# Patient Record
Sex: MALE | Race: WHITE | NOT HISPANIC OR LATINO | Employment: FULL TIME | URBAN - METROPOLITAN AREA
[De-identification: names, ages, dates, MRNs, and addresses within clinical notes are randomized per-mention and may not be internally consistent; named-entity substitution may affect disease eponyms.]

---

## 2017-08-06 ENCOUNTER — OCCUPATIONAL MEDICINE (OUTPATIENT)
Dept: URGENT CARE | Facility: CLINIC | Age: 22
End: 2017-08-06
Payer: COMMERCIAL

## 2017-08-06 VITALS
RESPIRATION RATE: 14 BRPM | HEIGHT: 63 IN | TEMPERATURE: 98.5 F | HEART RATE: 72 BPM | BODY MASS INDEX: 20.02 KG/M2 | OXYGEN SATURATION: 97 % | DIASTOLIC BLOOD PRESSURE: 60 MMHG | WEIGHT: 113 LBS | SYSTOLIC BLOOD PRESSURE: 102 MMHG

## 2017-08-06 DIAGNOSIS — L03.011 CELLULITIS OF FINGER OF RIGHT HAND: ICD-10-CM

## 2017-08-06 DIAGNOSIS — Z02.1 PRE-EMPLOYMENT DRUG SCREENING: ICD-10-CM

## 2017-08-06 LAB
AMP AMPHETAMINE: NORMAL
BREATH ALCOHOL COMMENT: NORMAL
COC COCAINE: NORMAL
INT CON NEG: NORMAL
INT CON POS: NORMAL
MET METHAMPHETAMINES: NORMAL
OPI OPIATES: NORMAL
PCP PHENCYCLIDINE: NORMAL
POC BREATHALIZER: 0 PERCENT (ref 0–0.01)
POC DRUG COMMENT 753798-OCCUPATIONAL HEALTH: NEGATIVE
THC: NORMAL

## 2017-08-06 PROCEDURE — 80305 DRUG TEST PRSMV DIR OPT OBS: CPT | Performed by: NURSE PRACTITIONER

## 2017-08-06 PROCEDURE — 82075 ASSAY OF BREATH ETHANOL: CPT | Performed by: NURSE PRACTITIONER

## 2017-08-06 PROCEDURE — 99203 OFFICE O/P NEW LOW 30 MIN: CPT | Mod: 29 | Performed by: NURSE PRACTITIONER

## 2017-08-06 RX ORDER — SULFAMETHOXAZOLE AND TRIMETHOPRIM 800; 160 MG/1; MG/1
1 TABLET ORAL 2 TIMES DAILY
Qty: 14 TAB | Refills: 0 | Status: SHIPPED | OUTPATIENT
Start: 2017-08-06 | End: 2017-08-11 | Stop reason: SDUPTHER

## 2017-08-06 RX ORDER — CEPHALEXIN 500 MG/1
500 CAPSULE ORAL 3 TIMES DAILY
Qty: 30 CAP | Refills: 0 | Status: CANCELLED | OUTPATIENT
Start: 2017-08-06 | End: 2017-08-16

## 2017-08-06 ASSESSMENT — ENCOUNTER SYMPTOMS
DIZZINESS: 0
FEVER: 0
TINGLING: 0
NAUSEA: 0
CHILLS: 0
SENSORY CHANGE: 0
BRUISES/BLEEDS EASILY: 0

## 2017-08-06 ASSESSMENT — LIFESTYLE VARIABLES: SUBSTANCE_ABUSE: 0

## 2017-08-06 NOTE — PROGRESS NOTES
"Subjective:      Hebert Sawyer is a 21 y.o. male who presents with Finger Injury    Chief Complaint   Patient presents with   • Finger Injury     NEW WC DOI 08/2/17 (R) ring finger       Denies past medical, surgical or family history that is significant to today's problem.   RX or OTC medications reviewed with patient today.   No Known Allergies         HPI DOI is uncertain to patient. He does not recall an injury but thinks that he may have had a small splinter in his finger from a wooden mop. Yesterday he noticed a small blister on his right 3rd finger and then he used a needle to 'pop' the blister. Blood and pus came out. Today there is increased redness of his entire finger. No further drainage but now it is very painful and he cannot bend his finger. Pain is severe. Treatment tried; popping it with a \"needle', OTC neosporin ointment. Tdap < 10 years per pt verbal report. Does not wear ring on his finger.     Review of Systems   Constitutional: Negative for fever and chills.   Gastrointestinal: Negative for nausea.   Musculoskeletal: Negative for joint pain.   Neurological: Negative for dizziness, tingling and sensory change.   Endo/Heme/Allergies: Does not bruise/bleed easily.   Psychiatric/Behavioral: Negative for substance abuse.          Objective:     /60 mmHg  Pulse 72  Temp(Src) 36.9 °C (98.5 °F)  Resp 14  Ht 1.6 m (5' 3\")  Wt 51.256 kg (113 lb)  BMI 20.02 kg/m2  SpO2 97%     Physical Exam   Constitutional: He is oriented to person, place, and time. He appears well-developed and well-nourished.   Cardiovascular: Normal rate.    Pulmonary/Chest: Effort normal.   Neurological: He is alert and oriented to person, place, and time.   Skin: There is erythema.   Psychiatric: He has a normal mood and affect. His behavior is normal. Thought content normal.   Nursing note and vitals reviewed.      Right 3rd finger with a  3 mm superficial ulcerated area on palmar side of  Proximal phalange " joint , surrounded by erythema and EMILY starting to extending into palm of hand and circumrential finger. No drainage.Tender with movement. No joint tenderness or crepitus with palpation. No streaks.  Limited ROM to right hand secondary to pain and swelling.        Assessment/Plan:     1. Cellulitis of finger of right hand    - sulfamethoxazole-trimethoprim (BACTRIM DS) 800-160 MG tablet; Take 1 Tab by mouth 2 times a day for 7 days.  Dispense: 14 Tab; Refill: 0      Epsom water soaks BID   Educated in proper administration of medication(s) ordered today including safety, possible SE, risks, benefits, rationale and alternatives to therapy.   See [place] D39 and C4

## 2017-08-06 NOTE — Clinical Note
"   [facility] [place] [facility]   [address] [facility] [address] [place] [address]  Phone: [phone] - Fax: [phone]        [facility] Progress Report and Disability Certification  Date of Service: 8/6/2017   No Show:  No  Date / Time of Next Visit: 8/9/2017@10:10 AM   Claim Information   Patient Name: Hebert Sawyer  Claim Number:     Employer:   [employer] Date of Injury: 8/2/2017     Insurer / TPA: Dwayne Hudson  ID / SSN:     Occupation: [employer]  Diagnosis: The encounter diagnosis was Cellulitis of finger of right hand.    Medical Information   Related to Industrial Injury?      Subjective Complaints:  DOI is uncertain to patient. He does not recall an injury but thinks that he may have had a small splinter in his finger from a wooden mop. Yesterday he noticed a small blister on his right 3rd finger and then he used a needle to 'pop' the blister. Blood and pus came out. Today there is increased redness of his entire finger. No further drainage but now it is very painful and he cannot bend his finger. Pain is severe. Treatment tried; popping it with a \"needle', OTC neosporin ointment. Tdap < 10 years per pt verbal report.    Objective Findings: Right 3rd finger with a  3 mm superficial ulcerated area on palmar side of  Proximal phalange joint , surrounded by erythema and EMILY starting to extending into palm of hand and circumrential finger. No drainage.Tender with movement. No joint tenderness or crepitus with palpation. No streaks.  Limited ROM to right hand secondary to pain and swelling.    Pre-Existing Condition(s): denies   Assessment:   Initial Visit    Status: Additional Care Required  Permanent Disability:     Plan: Medication    Diagnostics:      Comments:       Disability Information   Status: Released to Restricted Duty    From:  8/6/2017  Through: 8/9/2017 Restrictions are: Temporary   Physical Restrictions   Sitting:    Standing:    Stooping:    Bending:      "   Squatting:    Walking:    Climbing:    Pushing:      Pulling:    Other:    Reaching Above Shoulder (L):   Reaching Above Shoulder (R):       Reaching Below Shoulder (L):    Reaching Below Shoulder (R):      Not to exceed Weight Limits   Carrying(hrs):   Weight Limit(lb):   Lifting(hrs):   Weight  Limit(lb):     Comments: Keep right hand clean and dry. Dressing on right 3rd finger.     Repetitive Actions   Hands: i.e. Fine Manipulations from Grasping:     Feet: i.e. Operating Foot Controls:     Driving / Operate Machinery:     Physician Name: Sharee Coe AFranciscoPOLMAN Physician Signature:   SHAREE COE e-Signature: Dr. Monster Mckeon, Medical Director   Clinic Name / Location: [address] [address] Clinic Phone Number: Dept: [phone]   Appointment Time: 10:30 Am Visit Start Time: 10:47 AM   Check-In Time:  10:38 Am Visit Discharge Time:  11:32 AM   Original-Treating Physician or Chiropractor    Page 2-Insurer/TPA    Page 3-Employer    Page 4-Employee

## 2017-08-06 NOTE — MR AVS SNAPSHOT
"Hebert Tomasz Digna   2017 10:30 AM   Occupational Medicine   MRN: [id number]    Department:  [facility]   Dept Phone:  [phone]    Description:  Male : 1995   Provider:  JAMAL Rondon           Reason for Visit     Finger Injury NEW WC DOI 17 (R) ring finger      Allergies as of 2017     No Known Allergies      You were diagnosed with     Cellulitis of finger of right hand   [[id number]]   right 3rd finger      Vital Signs     Blood Pressure Pulse Temperature Respirations Height Weight    102/60 mmHg 72 36.9 °C (98.5 °F) 14 1.6 m (5' 3\") 51.256 kg (113 lb)    Body Mass Index Oxygen Saturation                20.02 kg/m2 97%          Basic Information     Date Of Birth Sex Race Ethnicity Preferred Language    1995 Male White Non- English      Your appointments     Aug 09, 2017 10:10 AM   Workers Compensation (Long) with Jose Bain D.O.   [address] [address]   [phone]              Health Maintenance     Patient has no pending health maintenance at this time      Current Immunizations     No immunizations on file.      Below and/or attached are the medications your provider expects you to take. Review all of your home medications and newly ordered medications with your provider and/or pharmacist. Follow medication instructions as directed by your provider and/or pharmacist. Please keep your medication list with you and share with your provider. Update the information when medications are discontinued, doses are changed, or new medications (including over-the-counter products) are added; and carry medication information at all times in the event of emergency situations     Allergies:  No Known Allergies          Medications  Valid as of: 2017 - 11:35 AM    Generic Name Brand Name Tablet Size Instructions for use    Sulfamethoxazole-Trimethoprim (Tab) BACTRIM DS " 800-160 MG Take 1 Tab by mouth 2 times a day for 7 days.        .                 Medicines prescribed today were sent to:     [facility] [address] [place] [address], [place] [address] [facility] [address]    [address] [facility] [address]    Phone: [phone] Fax: [phone]    Open 24 Hours?: No      Medication refill instructions:       If your prescription bottle indicates you have medication refills left, it is not necessary to call your provider’s office. Please contact your pharmacy and they will refill your medication.    If your prescription bottle indicates you do not have any refills left, you may request refills at any time through one of the following ways: The online Omada system (except Urgent Care), by calling your provider’s office, or by asking your pharmacy to contact your provider’s office with a refill request. Medication refills are processed only during regular business hours and may not be available until the next business day. Your provider may request additional information or to have a follow-up visit with you prior to refilling your medication.   *Please Note: Medication refills are assigned a new Rx number when refilled electronically. Your pharmacy may indicate that no refills were authorized even though a new prescription for the same medication is available at the pharmacy. Please request the medicine by name with the pharmacy before contacting your provider for a refill.           Omada Access Code: [email or web address]  Expires: 9/5/2017 11:35 AM    Your email address is not on file at [facility].  Email Addresses are required for you to sign up for Omada, please contact [phone] to verify your personal information and to provide your email address prior to attempting to register for Omada.    Hebert Sawyer  [address] [place] [address]    Omada  A secure, online tool to manage your health information     [facility]’s Omada® is a  secure, online tool that connects you to your personalized health information from the privacy of your home -- day or night - making it very easy for you to manage your healthcare. Once the activation process is completed, you can even access your medical information using the [employer] hailey, which is available for free in the Apple Hailey store or Google Play store.     To learn more about [employer], visit [email or web address]    There are two levels of access available (as shown below):   My Chart Features  [facility]  Urgent  Care Non-[facility] Primary Care Doctor   Email your healthcare team securely and privately 24/7 X X X    Manage appointments: schedule your next appointment; view details of past/upcoming appointments X      Request prescription refills. X      View recent personal medical records, including lab and immunizations X X X X   View health record, including health history, allergies, medications X X X X   Read reports about your outpatient visits, procedures, consult and ER notes X X X X   See your discharge summary, which is a recap of your hospital and/or ER visit that includes your diagnosis, lab results, and care plan X X  X     How to register for [employer]:  Once your e-mail address has been verified, follow the following steps to sign up for [employer].     1. Go to  [email or web address]  2. Click on the Sign Up Now box, which takes you to the New Member Sign Up page. You will need to provide the following information:  a. Enter your [employer] Access Code exactly as it appears at the top of this page. (You will not need to use this code after you’ve completed the sign-up process. If you do not sign up before the expiration date, you must request a new code.)   b. Enter your date of birth.   c. Enter your home email address.   d. Click Submit, and follow the next screen’s instructions.  3. Create a [employer] ID. This will be your [employer] login ID and cannot be  changed, so think of one that is secure and easy to remember.  4. Create a [facility] password. You can change your password at any time.  5. Enter your Password Reset Question and Answer. This can be used at a later time if you forget your password.   6. Enter your e-mail address. This allows you to receive e-mail notifications when new information is available in St[facility].  7. Click Sign Up. You can now view your health information.    For assistance activating your St[employer] account, call [phone]

## 2017-08-06 NOTE — Clinical Note
"EMPLOYEE’S CLAIM FOR COMPENSATION/ REPORT OF INITIAL TREATMENT  FORM C-4    EMPLOYEE’S CLAIM - PROVIDE ALL INFORMATION REQUESTED   First Name  Hebert Last Name  Digna Birthdate                    1995                Sex  male Claim Number   Home Address  [address] Age  21 y.o. Height  1.6 m (5' 3\") Weight  51.256 kg (113 lb) [facility]     [facility] Zip  [address] Telephone  [phone] (home)    Mailing Address  [address] [facility] Zip  [address] Primary Language Spoken  English    Insurer  Unknown Third Party   Dwayne Hudson Employee's Occupation (Job Title) When Injury or Occupational Disease Occurred      Employer's Name    [employer]      Employer Address   [address]   [address]   Date of Injury  8/2/2017               Hour of Injury  6:00 PM Date Employer Notified  8/6/2017 Last Day of Work after Injury or Occupational Disease  8/3/2017 Supervisor to Whom Injury Reported  Aurelio   Address or Location of Accident (if applicable)  [[address] [facility] [address]]   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 (Be specific an answer in detail. Use additional sheet if necessary)  While sweeping I recieved a splinter, Later my finger began to swll and became infected.   If you believe that you have an occupational disease, when did you first have knowledge of the disability and it relationship to your employment?  na Witnesses to the Accident  na      Nature of Injury or Occupational Disease  Defer  Part(s) of Body Injured or Affected  Hand (R), Finger (R),     I certify that the above is true and correct to the best of my knowledge and that I have provided this information in order to obtain the benefits of [place] Insurance and " [place] or [place]).  I hereby authorize any physician, chiropractor, surgeon, practitioner, or other person, any hospital, including [facility], any medical service organization, any insurance company, or other institution or organization to release to each other, any medical or other information, including benefits paid or payable, pertinent to this injury or disease, except information relative to diagnosis, treatment and/or counseling for AIDS, psychological conditions, alcohol or controlled substances, for which I must give specific authorization.  A Photostat of this authorization shall be as valid as the original.     Date   Place   Employee’s Signature   THIS REPORT MUST BE COMPLETED AND MAILED WITHIN 3 WORKING DAYS OF TREATMENT   Place  [facility]  Name of Facility  [facility]   Date  8/6/2017 Diagnosis  (L03.011) Cellulitis of finger of right hand Is there evidence the injured employee was under the influence of alcohol and/or another controlled substance at the time of accident?   Hour  10:47 AM Description of Injury or Disease  The encounter diagnosis was Cellulitis of finger of right hand.   Comments:unable to determine. Denies injury. Denies removal of a splinter or even remembering that a splinter got into finger.    Treatment  Wound care.   Have you advised the patient to remain off work five days or more? No   X-Ray Findings      If Yes   From Date  To Date      From information given by the employee, together with medical evidence, can you directly connect this injury or occupational disease as job incurred?    Comments:unable to determine - see previous comments.  If No Full Duty  No Modified Duty  Yes   Is additional medical care by a physician indicated?  Yes If Modified Duty, Specify any Limitations / Restrictions  See [place]D39    Do you know of any previous injury or disease contributing to  "this condition or occupational disease?                            No   Date  8/6/2017 Print Doctor’s Name JAMAL Rondon I certify the employer’s copy of  this form was mailed on:   Address  [address] Insurer’s Use Only     [place] Zip  [address]    Provider’s Tax ID Number  [id number]  Telephone  Dept: [phone]          SHAREE MAZARIEGOS   e-Signature: Dr. Monster Mckeon, Medical Director Degree  APN        ORIGINAL-TREATING PHYSICIAN OR CHIROPRACTOR    PAGE 2-INSURER/TPA    PAGE 3-EMPLOYER    PAGE 4-EMPLOYEE             Form C-4 (rev10/07)              BRIEF DESCRIPTION OF RIGHTS AND BENEFITS  (Pursuant to [place]RS 616C.050)    Notice of Injury or Occupational Disease (Incident Report Form C-1): If an injury or occupational disease (OD) arises out of and in the  course of employment, you must provide written notice to your employer as soon as practicable, but no later than 7 days after the accident or  OD. Your employer shall maintain a sufficient supply of the required forms.    Claim for Compensation (Form C-4): If medical treatment is sought, the form C-4 is available at the place of initial treatment. A completed  \"Claim for Compensation\" (Form C-4) must be filed within 90 days after an accident or OD. The treating physician or chiropractor must,  within 3 working days after treatment, complete and mail to the employer, the employer's insurer and third-party , the Claim for  Compensation.    Medical Treatment: If you require medical treatment for your on-the-job injury or OD, you may be required to select a physician or  chiropractor from a list provided by your workers’ compensation insurer, if it has contracted with an Organization for Managed Care (MCO) or  Preferred Provider Organization (PPO) or providers of health care. If your employer has not entered into a contract with an MCO or PPO, you  may select a physician or " chiropractor from the Panel of Physicians and Chiropractors. Any medical costs related to your industrial injury or  OD will be paid by your insurer.    Temporary Total Disability (TTD): If your doctor has certified that you are unable to work for a period of at least 5 consecutive days, or 5  cumulative days in a 20-day period, or places restrictions on you that your employer does not accommodate, you may be entitled to TTD  compensation.    Temporary Partial Disability (TPD): If the wage you receive upon reemployment is less than the compensation for TTD to which you are  entitled, the insurer may be required to pay you TPD compensation to make up the difference. TPD can only be paid for a maximum of 24  months.    Permanent Partial Disability (PPD): When your medical condition is stable and there is an indication of a PPD as a result of your injury or  OD, within 30 days, your insurer must arrange for an evaluation by a rating physician or chiropractor to determine the degree of your PPD. The  amount of your PPD award depends on the date of injury, the results of the PPD evaluation and your age and wage.    Permanent Total Disability (PTD): If you are medically certified by a treating physician or chiropractor as permanently and totally disabled  and have been granted a PTD status by your insurer, you are entitled to receive monthly benefits not to exceed 66 2/3% of your average  monthly wage. The amount of your PTD payments is subject to reduction if you previously received a PPD award.    Vocational Rehabilitation Services: You may be eligible for vocational rehabilitation services if you are unable to return to the job due to a  permanent physical impairment or permanent restrictions as a result of your injury or occupational disease.    Transportation and Per Darlin Reimbursement: You may be eligible for travel expenses and per darlin associated with medical treatment.    Reopening: You may be able to reopen your  claim if your condition worsens after claim closure.    Appeal Process: If you disagree with a written determination issued by the insurer or the insurer does not respond to your request, you may  appeal to the Department of Administration, , by following the instructions contained in your determination letter. You must  appeal the determination within 70 days from the date of the determination letter at [address], [address], [place], [place]  [address], or [address], [place], [place] [address]. If you disagree with the  decision, you may appeal to the  Department of Administration, . You must file your appeal within 30 days from the date of the  decision  letter at [address], [address], [place], [place] [address], or [address], [facility] [address], [place], [place] [address]. If you  disagree with a decision of an , you may file a petition for judicial review with the District Court. You must do so within 30  days of the Appeal Officer’s decision. You may be represented by an  at your own expense or you may contact the [facility] for possible  representation.    [place]  for Injured Workers (NAIW): If you disagree with a  decision, you may request that NAIW represent you  without charge at an  Hearing. For information regarding denial of benefits, you may contact the [facility] at: [address], [place] [address], [phone], or [address], [place] [address], [phone]    To File a Complaint with the Division: If you wish to file a complaint with the  of the Division of Industrial Relations (DIR),  please contact the Workers’ Compensation Section, [address], [address], [place], [place] [address], telephone [phone], or  [address], [place]  [address], telephone ([phone]) [phone].    For assistance with Workers’ Compensation Issues: you may contact the Office of the Governor Consumer Health Assistance, [address] [place] [address], [facility] [address], [place] [address], Toll Free 1-108.673.4909, Web site: http://[employer].[facility].nv., E-mail  [email or web address].[email or web address].                                                                                                                                                                                                                                   __________________________________________________________________                                                                   _________________                Employee Name / Signature                                                                                                                                                       Date                                                                                                                                                                                                     D-2 (rev. 10/07)

## 2017-08-09 ENCOUNTER — OCCUPATIONAL MEDICINE (OUTPATIENT)
Dept: OCCUPATIONAL MEDICINE | Facility: CLINIC | Age: 22
End: 2017-08-09
Payer: COMMERCIAL

## 2017-08-09 VITALS
OXYGEN SATURATION: 95 % | DIASTOLIC BLOOD PRESSURE: 62 MMHG | SYSTOLIC BLOOD PRESSURE: 112 MMHG | HEART RATE: 90 BPM | HEIGHT: 63 IN | BODY MASS INDEX: 20.02 KG/M2 | WEIGHT: 113 LBS | TEMPERATURE: 99.3 F

## 2017-08-09 DIAGNOSIS — L03.011 CELLULITIS OF FINGER OF RIGHT HAND: ICD-10-CM

## 2017-08-09 PROCEDURE — 99204 OFFICE O/P NEW MOD 45 MIN: CPT | Performed by: PREVENTIVE MEDICINE

## 2017-08-09 RX ORDER — CEFTRIAXONE 1 G/1
1 INJECTION, POWDER, FOR SOLUTION INTRAMUSCULAR; INTRAVENOUS ONCE
Status: COMPLETED | OUTPATIENT
Start: 2017-08-09 | End: 2017-08-09

## 2017-08-09 RX ADMIN — CEFTRIAXONE 1 G: 1 INJECTION, POWDER, FOR SOLUTION INTRAMUSCULAR; INTRAVENOUS at 10:40

## 2017-08-09 ASSESSMENT — PAIN SCALES - GENERAL: PAINLEVEL: 2=MINIMAL-SLIGHT

## 2017-08-09 NOTE — Clinical Note
[address],   [address] [place] [address]  Phone: [phone] - Fax: [phone]        Occupational Health [facility] Progress Report and Disability Certification  Date of Service: 8/9/2017   No Show:  No  Date / Time of Next Visit: 8/11/2017, okay to double book at 8am   Claim Information   Patient Name: Hebert Sawyer  Claim Number:     Employer:   [employer]  Date of Injury: 8/2/2017     Insurer / TPA: Dwayne Hudson  ID / SSN:     Occupation: [employer]  Diagnosis: The encounter diagnosis was Cellulitis of finger of right hand.    Medical Information   Related to Industrial Injury?   Comments:Indeterminate    Subjective Complaints:  DOI 8/2/2017: He does not recall a specific injury but thinks that he may have had a small splinter in his finger from a wooden mop. He developed a blister on his right middle finger, which he popped and drained pus and blood. The finger became increasingly swollen and red and so presented to urgent care on 8/6/17. He was given Bactrim. Patient notes possible mild improvement in erythema and swelling. He states the wound has been draining pus. He's been doing Epsom salt soaks twice daily. He's been taking the Bactrim as prescribed. Denies any fevers or chills. Continues her pain in the third digit in hand, contents of restricted range of motion with finger.   Objective Findings: Right Hand: Ruptured bullae and third digit near PIP joint. Small open abscess in area of ruptured bullae. Area with significant surrounding erythema and swelling extending just past the MCP and almost to the DIP joints. Open abscess draining pus. Abscess was expressed manually until no drainage was forthcoming.. Limited range of motion of the third digit due to pain and swelling.   Pre-Existing Condition(s):     Assessment:   Condition Same    Status: Additional Care Required  Permanent Disability:No    Plan:      Diagnostics:      Comments:  Given  minimal improvement gave Rocephin 1g IM once.  Continue Bactrim  Change dressing over abscess at least twice daily, keep are clean and dry  Continue Epsom salt soaks twice daily, dry  thoroughly afterwards.  Restricted duty  Follow-up Friday    Disability Information   Status: Released to Restricted Duty    From:  8/9/2017  Through: 8/11/2017 Restrictions are: Temporary   Physical Restrictions   Sitting:    Standing:    Stooping:    Bending:      Squatting:    Walking:    Climbing:    Pushing:      Pulling:    Other:    Reaching Above Shoulder (L):   Reaching Above Shoulder (R):       Reaching Below Shoulder (L):    Reaching Below Shoulder (R):      Not to exceed Weight Limits   Carrying(hrs):   Weight Limit(lb):   Lifting(hrs):   Weight  Limit(lb):     Comments: Limited use of right hand. No lifting more than 5 lbs. Minimize forceful gripping, pinching or grasping.    Repetitive Actions   Hands: i.e. Fine Manipulations from Grasping:     Feet: i.e. Operating Foot Controls:     Driving / Operate Machinery:     Physician Name: Jose Bain D.O. Physician Signature: everettSignTAYLJOSE QUINTANILLA D.O. e-Signature: Dr. Monster Mckeon, Medical Director   Clinic Name / Location: [address],   [address]  [place] [address] Clinic Phone Number: Dept: [phone]   Appointment Time: 10:10 Am Visit Start Time: 9:57 AM   Check-In Time:  9:55 Am Visit Discharge Time: 11:04 AM   Original-Treating Physician or Chiropractor    Page 2-Insurer/TPA    Page 3-Employer    Page 4-Employee

## 2017-08-09 NOTE — PROGRESS NOTES
"Subjective:      Hebert Sawyer is a 21 y.o. male who presents with Follow-Up      DOI 8/2/2017: He does not recall a specific injury but thinks that he may have had a small splinter in his finger from a wooden mop. He developed a blister on his right middle finger, which he popped and drained pus and blood. The finger became increasingly swollen and red and so presented to urgent care on 8/6/17. He was given Bactrim. Patient notes possible mild improvement in erythema and swelling. He states the wound has been draining pus. He's been doing Epsom salt soaks twice daily. He's been taking the Bactrim as prescribed. Denies any fevers or chills. Continues her pain in the third digit in hand, contents of restricted range of motion with finger.     HPI    ROS  ROS: All systems were reviewed on intake form, form was reviewed and signed. See scanned documents in media. Pertinent positives and negatives included in HPI.    PMH: No pertinent past medical history to this problem  MEDS: Medications were reviewed in Epic  ALLERGIES: No Known Allergies  SOCHX: Works as a   FH: No pertinent family history to this problem      Objective:     /62 mmHg  Pulse 90  Temp(Src) 37.4 °C (99.3 °F)  Ht 1.6 m (5' 3\")  Wt 51.256 kg (113 lb)  BMI 20.02 kg/m2  SpO2 95%     Physical Exam   Constitutional: He is oriented to person, place, and time. He appears well-developed and well-nourished.   HENT:   Right Ear: External ear normal.   Left Ear: External ear normal.   Eyes: Conjunctivae and EOM are normal.   Cardiovascular: Normal rate.    Pulmonary/Chest: Effort normal. No respiratory distress.   Neurological: He is alert and oriented to person, place, and time.   Skin: Skin is warm and dry.   Psychiatric: He has a normal mood and affect. Judgment normal.       Right Hand: Ruptured bullae and third digit near PIP joint. Small open abscess in area of ruptured bullae. Area with significant surrounding erythema and " swelling extending just past the MCP and almost to the DIP joints. Open abscess draining pus. Abscess was expressed manually until no drainage was forthcoming.. Limited range of motion of the third digit due to pain and swelling.       Assessment/Plan:     1. Cellulitis of finger of right hand  - cefTRIAXone (ROCEPHIN) injection 1 g; 1,000 mg by Intramuscular route Once.    Given minimal improvement gave Rocephin 1g IM once.  Continue Bactrim  Change dressing over abscess at least twice daily, keep are clean and dry  Continue Epsom salt soaks twice daily, dry  thoroughly afterwards.  Restricted duty  Follow-up Friday

## 2017-08-09 NOTE — MR AVS SNAPSHOT
"        Hebert Sawyer   2017 10:10 AM   Occupational Medicine   MRN: [id number]    Department:  [facility]   Dept Phone:  [phone]    Description:  Male : 1995   Provider:  Jose Bain D.O.           Reason for Visit     Follow-Up WC DOI 2017 - R Finger -       Allergies as of 2017     No Known Allergies      You were diagnosed with     Cellulitis of finger of right hand   [[id number]]         Vital Signs     Blood Pressure Pulse Temperature Height Weight Body Mass Index    112/62 mmHg 90 37.4 °C (99.3 °F) 1.6 m (5' 3\") 51.256 kg (113 lb) 20.02 kg/m2    Oxygen Saturation                   95%           Basic Information     Date Of Birth Sex Race Ethnicity Preferred Language    1995 Male White Non- English      Health Maintenance        Date Due Completion Dates    IMM HEP B VACCINE (1 of 3 - Primary Series) 1995 ---    IMM HEP A VACCINE (1 of 2 - Standard Series) 10/2/1998 ---    IMM HPV VACCINE (1 of 3 - Male 3 Dose Series) 10/2/2008 ---    IMM VARICELLA (CHICKENPOX) VACCINE (1 of 2 - 2 Dose Adolescent Series) 10/2/2010 ---    IMM MENINGOCOCCAL VACCINE (MCV4) (1 of 1) 10/2/2013 ---    IMM DTaP/Tdap/Td Vaccine (1 - Tdap) 10/2/2016 ---    IMM INFLUENZA (1) 2017 ---            Current Immunizations     No immunizations on file.      Below and/or attached are the medications your provider expects you to take. Review all of your home medications and newly ordered medications with your provider and/or pharmacist. Follow medication instructions as directed by your provider and/or pharmacist. Please keep your medication list with you and share with your provider. Update the information when medications are discontinued, doses are changed, or new medications (including over-the-counter products) are added; and carry medication information at all times in the event of emergency situations     Allergies:  No Known Allergies          Medications  Valid as of: August " 09, 2017 - 10:57 AM    Generic Name Brand Name Tablet Size Instructions for use    Sulfamethoxazole-Trimethoprim (Tab) BACTRIM -160 MG Take 1 Tab by mouth 2 times a day for 7 days.        .                 Medicines prescribed today were sent to:     [facility], [place] - [address] AT [facility] [address]    [address] [facility] [place] [address]    Phone: [phone] Fax: [phone]    Open 24 Hours?: No      Medication refill instructions:       If your prescription bottle indicates you have medication refills left, it is not necessary to call your provider’s office. Please contact your pharmacy and they will refill your medication.    If your prescription bottle indicates you do not have any refills left, you may request refills at any time through one of the following ways: The online Zoop system (except Urgent Care), by calling your provider’s office, or by asking your pharmacy to contact your provider’s office with a refill request. Medication refills are processed only during regular business hours and may not be available until the next business day. Your provider may request additional information or to have a follow-up visit with you prior to refilling your medication.   *Please Note: Medication refills are assigned a new Rx number when refilled electronically. Your pharmacy may indicate that no refills were authorized even though a new prescription for the same medication is available at the pharmacy. Please request the medicine by name with the pharmacy before contacting your provider for a refill.           Zoop Access Code: [email or web address]  Expires: 9/5/2017 11:35 AM    Your email address is not on file at [facility].  Email Addresses are required for you to sign up for Zoop, please contact [phone] to verify your personal information and to provide your email address prior to attempting to register for Zoop.    Hebert Sawyer  [address]  [place] [address]    Yoyot  A secure, online tool to manage your health information     [employer]’s Yoyot® is a secure, online tool that connects you to your personalized health information from the privacy of your home -- day or night - making it very easy for you to manage your healthcare. Once the activation process is completed, you can even access your medical information using the [employer] hailey, which is available for free in the Apple Hailey store or Google Play store.     To learn more about [employer], visit www.Schedule C [email or web address]/Yoyot    There are two levels of access available (as shown below):   My Chart Features  [facility] Primary Care Doctor [facility]  Specialists [facility]  Urgent  Care Non-[facility] Primary Care Doctor   Email your healthcare team securely and privately 24/7 X X X    Manage appointments: schedule your next appointment; view details of past/upcoming appointments X      Request prescription refills. X      View recent personal medical records, including lab and immunizations X X X X   View health record, including health history, allergies, medications X X X X   Read reports about your outpatient visits, procedures, consult and ER notes X X X X   See your discharge summary, which is a recap of your hospital and/or ER visit that includes your diagnosis, lab results, and care plan X X  X     How to register for [employer]:  Once your e-mail address has been verified, follow the following steps to sign up for [employer].     1. Go to  [email or web address]  2. Click on the Sign Up Now box, which takes you to the New Member Sign Up page. You will need to provide the following information:  a. Enter your [employer] Access Code exactly as it appears at the top of this page. (You will not need to use this code after you’ve completed the sign-up process. If you do not sign up before the expiration date, you must request a new code.)   b. Enter your date of birth.   c. Enter your home email address.    d. Click Submit, and follow the next screen’s instructions.  3. Create a Home Online Income Systemst ID. This will be your Home Online Income Systemst login ID and cannot be changed, so think of one that is secure and easy to remember.  4. Create a Home Online Income Systemst password. You can change your password at any time.  5. Enter your Password Reset Question and Answer. This can be used at a later time if you forget your password.   6. Enter your e-mail address. This allows you to receive e-mail notifications when new information is available in 12Society.  7. Click Sign Up. You can now view your health information.    For assistance activating your 12Society account, call [phone]

## 2017-08-11 ENCOUNTER — OCCUPATIONAL MEDICINE (OUTPATIENT)
Dept: OCCUPATIONAL MEDICINE | Facility: CLINIC | Age: 22
End: 2017-08-11
Payer: COMMERCIAL

## 2017-08-11 VITALS
HEART RATE: 77 BPM | SYSTOLIC BLOOD PRESSURE: 98 MMHG | DIASTOLIC BLOOD PRESSURE: 70 MMHG | BODY MASS INDEX: 19.49 KG/M2 | TEMPERATURE: 98.7 F | WEIGHT: 110 LBS | OXYGEN SATURATION: 97 % | RESPIRATION RATE: 12 BRPM | HEIGHT: 63 IN

## 2017-08-11 DIAGNOSIS — L03.011 CELLULITIS OF FINGER OF RIGHT HAND: ICD-10-CM

## 2017-08-11 PROCEDURE — 99213 OFFICE O/P EST LOW 20 MIN: CPT | Performed by: PREVENTIVE MEDICINE

## 2017-08-11 RX ORDER — AMOXICILLIN AND CLAVULANATE POTASSIUM 875; 125 MG/1; MG/1
1 TABLET, FILM COATED ORAL 2 TIMES DAILY
Qty: 20 TAB | Refills: 0 | Status: SHIPPED | OUTPATIENT
Start: 2017-08-11 | End: 2017-08-21

## 2017-08-11 RX ORDER — SULFAMETHOXAZOLE AND TRIMETHOPRIM 800; 160 MG/1; MG/1
1 TABLET ORAL 2 TIMES DAILY
Qty: 6 TAB | Refills: 0 | Status: SHIPPED | OUTPATIENT
Start: 2017-08-11 | End: 2017-08-14

## 2017-08-11 ASSESSMENT — PAIN SCALES - GENERAL: PAINLEVEL: 1=MINIMAL PAIN

## 2017-08-11 NOTE — MR AVS SNAPSHOT
"        Hebert Sawyer   2017 8:00 AM   Occupational Medicine   MRN: [id number]    Department:  [facility]   Dept Phone:  [phone]    Description:  Male : 1995   Provider:  Jose Bain D.O.           Reason for Visit     Follow-Up WC DOI 2017 - R Finger - Better - ROOM 2      Allergies as of 2017     No Known Allergies      You were diagnosed with     Cellulitis of finger of right hand   [[id number]]         Vital Signs     Blood Pressure Pulse Temperature Respirations Height Weight    98/70 mmHg 77 37.1 °C (98.7 °F) 12 1.6 m (5' 3\") 49.896 kg (110 lb)    Body Mass Index Oxygen Saturation                19.49 kg/m2 97%          Basic Information     Date Of Birth Sex Race Ethnicity Preferred Language    1995 Male White Non- English      Your appointments     Aug 15, 2017  8:20 AM   Workers Compensation with Jose Bain D.O.   [address] [id number] [phone]              Health Maintenance        Date Due Completion Dates    IMM HEP B VACCINE (1 of 3 - Primary Series) 1995 ---    IMM HEP A VACCINE (1 of 2 - Standard Series) 10/2/1998 ---    IMM HPV VACCINE (1 of 3 - Male 3 Dose Series) 10/2/2008 ---    IMM VARICELLA (CHICKENPOX) VACCINE (1 of 2 - 2 Dose Adolescent Series) 10/2/2010 ---    IMM MENINGOCOCCAL VACCINE (MCV4) (1 of 1) 10/2/2013 ---    IMM DTaP/Tdap/Td Vaccine (1 - Tdap) 10/2/2016 ---    IMM INFLUENZA (1) 2017 ---            Current Immunizations     No immunizations on file.      Below and/or attached are the medications your provider expects you to take. Review all of your home medications and newly ordered medications with your provider and/or pharmacist. Follow medication instructions as directed by your provider and/or pharmacist. Please keep your medication list with you and share with your provider. Update the information when medications are discontinued, doses " are changed, or new medications (including over-the-counter products) are added; and carry medication information at all times in the event of emergency situations     Allergies:  No Known Allergies          Medications  Valid as of: August 11, 2017 -  8:36 AM    Generic Name Brand Name Tablet Size Instructions for use    Amoxicillin-Pot Clavulanate (Tab) AUGMENTIN 875-125 MG Take 1 Tab by mouth 2 times a day for 10 days.        Sulfamethoxazole-Trimethoprim (Tab) BACTRIM -160 MG Take 1 Tab by mouth 2 times a day for 3 days.        .                 Medicines prescribed today were sent to:     [facility] [address] [place] [address] [place], [place] - [address] AT [facility] & [facility]    [address] [facility] [place] [address]    Phone: [phone] Fax: [phone]    Open 24 Hours?: No      Medication refill instructions:       If your prescription bottle indicates you have medication refills left, it is not necessary to call your provider’s office. Please contact your pharmacy and they will refill your medication.    If your prescription bottle indicates you do not have any refills left, you may request refills at any time through one of the following ways: The online BeachMint system (except Urgent Care), by calling your provider’s office, or by asking your pharmacy to contact your provider’s office with a refill request. Medication refills are processed only during regular business hours and may not be available until the next business day. Your provider may request additional information or to have a follow-up visit with you prior to refilling your medication.   *Please Note: Medication refills are assigned a new Rx number when refilled electronically. Your pharmacy may indicate that no refills were authorized even though a new prescription for the same medication is available at the pharmacy. Please request the medicine by name with the pharmacy before contacting your provider for a refill.           BeachMint  Access Code: [email or web address]  Expires: 9/5/2017 11:35 AM    Your email address is not on file at [facility].  Email Addresses are required for you to sign up for Flipora, please contact [phone] to verify your personal information and to provide your email address prior to attempting to register for Flipora.    Hebert Tomasz Digna  [address] [facility] [place] [address]    Adspert | Bidmanagement GmbHt  A secure, online tool to manage your health information     [employer]’s Flipora® is a secure, online tool that connects you to your personalized health information from the privacy of your home -- day or night - making it very easy for you to manage your healthcare. Once the activation process is completed, you can even access your medical information using the Flipora chris, which is available for free in the Apple Chris store or Google Play store.     To learn more about Flipora, visit [email or web address]/Adspert | Bidmanagement GmbHt    There are two levels of access available (as shown below):   My Chart Features  [facility] Primary Care Doctor [facility]  Specialists [facility]  Urgent  Care Non-[facility] Primary Care Doctor   Email your healthcare team securely and privately 24/7 X X X    Manage appointments: schedule your next appointment; view details of past/upcoming appointments X      Request prescription refills. X      View recent personal medical records, including lab and immunizations X X X X   View health record, including health history, allergies, medications X X X X   Read reports about your outpatient visits, procedures, consult and ER notes X X X X   See your discharge summary, which is a recap of your hospital and/or ER visit that includes your diagnosis, lab results, and care plan X X  X     How to register for Adspert | Bidmanagement [employer]t:  Once your e-mail address has been verified, follow the following steps to sign up for Adspert | Bid[employer]t.     1. Go to  [email or web address]  2. Click on the Sign Up Now box, which takes you to the New Member Sign Up page. You will need  to provide the following information:  a. Enter your [employer] Access Code exactly as it appears at the top of this page. (You will not need to use this code after you’ve completed the sign-up process. If you do not sign up before the expiration date, you must request a new code.)   b. Enter your date of birth.   c. Enter your home email address.   d. Click Submit, and follow the next screen’s instructions.  3. Create a [employer] ID. This will be your [employer] login ID and cannot be changed, so think of one that is secure and easy to remember.  4. Create a [employer] password. You can change your password at any time.  5. Enter your Password Reset Question and Answer. This can be used at a later time if you forget your password.   6. Enter your e-mail address. This allows you to receive e-mail notifications when new information is available in [employer].  7. Click Sign Up. You can now view your health information.    For assistance activating your [employer] account, call [phone]

## 2017-08-11 NOTE — Clinical Note
[address],   [address] [place] [address]  Phone: [phone] - Fax: [phone]        [facility] Progress Report and Disability Certification  Date of Service: 8/11/2017   No Show:  No  Date / Time of Next Visit: 8/15/2017 @ 8:20am   Claim Information   Patient Name: Hebert Sawyer  Claim Number:     Employer:   [employer] Date of Injury: 8/2/2017     Insurer / TPA: Dwayne Hudson  ID / SSN:     Occupation: [employer]  Diagnosis: The encounter diagnosis was Cellulitis of finger of right hand.    Medical Information   Related to Industrial Injury? No    Subjective Complaints:  DOI 8/2/2017: He does not recall a specific injury but thinks that he may have had a small splinter in his finger from a wooden mop. He developed a blister on his right middle finger, which he popped and drained pus and blood. The finger became increasingly swollen and red and so presented to urgent care on 8/6/17. He was given Bactrim on 8/6. Then given IM Rocephin on 8/9. Patient notes some improvement with erythema and swelling, less drainage.   Objective Findings: Right Hand: Ruptured bullae and third digit near PIP joint. Small open abscess in area of ruptured bullae, no drainage . Area with significant surrounding erythema and swelling extending just past the MCP and almost to the DIP joints, somewhat lessened compared to last visit. Limited range of motion of the third digit due to pain and swelling, but slightly improved.   Pre-Existing Condition(s):     Assessment:   Condition Same    Status: Additional Care Required  Permanent Disability:No    Plan:      Diagnostics:      Comments:  Extended Bactrim from 7 days to 10 day supply  Prescribed Augmentin twice daily for 10 days  Continue wound care  Restricted duty   Follow up Tuesday    Disability Information   Status: Released to Full Duty    From:  8/11/2017  Through: 8/15/2017 Restrictions are:  Temporary   Physical Restrictions   Sitting:    Standing:    Stooping:    Bending:      Squatting:    Walking:    Climbing:    Pushing:      Pulling:    Other:    Reaching Above Shoulder (L):   Reaching Above Shoulder (R):       Reaching Below Shoulder (L):    Reaching Below Shoulder (R):      Not to exceed Weight Limits   Carrying(hrs):   Weight Limit(lb):   Lifting(hrs):   Weight  Limit(lb):     Comments: Limited use of right hand. No lifting more than 5 lbs. Minimize forceful gripping, pinching or grasping.      Repetitive Actions   Hands: i.e. Fine Manipulations from Grasping:     Feet: i.e. Operating Foot Controls:     Driving / Operate Machinery:     Physician Name: Jose Bain D.O. Physician Signature: everettSignTAJOSE BLAKELY D.O. e-Signature: Dr. Monster Mckeon, Medical Director   Clinic Name / Location: [address],   [address] [place] [address] Clinic Phone Number: Dept: [phone]   Appointment Time: 8:00 Am Visit Start Time: 8:16 AM   Check-In Time:  8:05 Am Visit Discharge Time:  8:33am   Original-Treating Physician or Chiropractor    Page 2-Insurer/TPA    Page 3-Employer    Page 4-Employee

## 2017-08-11 NOTE — PROGRESS NOTES
"Subjective:      Hebert Sawyer is a 21 y.o. male who presents with Follow-Up      DOI 8/2/2017: He does not recall a specific injury but thinks that he may have had a small splinter in his finger from a wooden mop. He developed a blister on his right middle finger, which he popped and drained pus and blood. The finger became increasingly swollen and red and so presented to urgent care on 8/6/17. He was given Bactrim on 8/6. Then given IM Rocephin on 8/9. Patient notes some improvement with erythema and swelling, less drainage.     HPI    ROS  SOCHX: Works as a    FH: No pertinent family history to this problem.         Objective:     BP 98/70 mmHg  Pulse 77  Temp(Src) 37.1 °C (98.7 °F)  Resp 12  Ht 1.6 m (5' 3\")  Wt 49.896 kg (110 lb)  BMI 19.49 kg/m2  SpO2 97%     Physical Exam   Constitutional: He is oriented to person, place, and time. He appears well-developed and well-nourished.   Cardiovascular: Normal rate.    Pulmonary/Chest: Effort normal.   Neurological: He is alert and oriented to person, place, and time.   Skin: Skin is warm and dry.   Psychiatric: He has a normal mood and affect. Judgment normal.       Right Hand: Ruptured bullae and third digit near PIP joint. Small open abscess in area of ruptured bullae, no drainage . Area with significant surrounding erythema and swelling extending just past the MCP and almost to the DIP joints, somewhat lessened compared to last visit. Limited range of motion of the third digit due to pain and swelling, but slightly improved.       Assessment/Plan:     1. Cellulitis of finger of right hand  - sulfamethoxazole-trimethoprim (BACTRIM DS) 800-160 MG tablet; Take 1 Tab by mouth 2 times a day for 3 days.  Dispense: 6 Tab; Refill: 0  - amoxicillin-clavulanate (AUGMENTIN) 875-125 MG Tab; Take 1 Tab by mouth 2 times a day for 10 days.  Dispense: 20 Tab; Refill: 0    Extended Bactrim from 7 days to 10 day supply   Prescribed Augmentin twice daily for 10 " days   Continue wound care   Restricted duty   Follow up Tuesday

## 2017-08-15 ENCOUNTER — OCCUPATIONAL MEDICINE (OUTPATIENT)
Dept: OCCUPATIONAL MEDICINE | Facility: CLINIC | Age: 22
End: 2017-08-15
Payer: COMMERCIAL

## 2017-08-15 VITALS
SYSTOLIC BLOOD PRESSURE: 110 MMHG | WEIGHT: 110 LBS | DIASTOLIC BLOOD PRESSURE: 64 MMHG | OXYGEN SATURATION: 100 % | HEIGHT: 63 IN | TEMPERATURE: 98.4 F | RESPIRATION RATE: 12 BRPM | HEART RATE: 67 BPM | BODY MASS INDEX: 19.49 KG/M2

## 2017-08-15 DIAGNOSIS — L03.011 CELLULITIS OF FINGER OF RIGHT HAND: ICD-10-CM

## 2017-08-15 PROCEDURE — 99213 OFFICE O/P EST LOW 20 MIN: CPT | Performed by: PREVENTIVE MEDICINE

## 2017-08-15 ASSESSMENT — PAIN SCALES - GENERAL: PAINLEVEL: NO PAIN

## 2017-08-15 NOTE — PROGRESS NOTES
"Subjective:      Hebert Sawyer is a 21 y.o. male who presents with Follow-Up      DOI 8/2/2017: Does not recall a specific injury but thinks that he may have had a small splinter in his right middle finger from a wooden mop. He developed a blister, which he popped and drained pus and blood. The finger became increasingly swollen and red and so presented to urgent care on 8/6/17. He was given Bactrim on 8/6. Then given IM Rocephin on 8/9 and started on Augmentin. Patient notes significant improvement in the swelling and pain at the finger. Denies any drainage. He states she's been taking antibiotic as prescribed.           HPI    ROS  SOCHX: Works as a    FH: No pertinent family history to this problem.         Objective:     /64 mmHg  Pulse 67  Temp(Src) 36.9 °C (98.4 °F)  Resp 12  Ht 1.6 m (5' 2.99\")  Wt 49.896 kg (110 lb)  BMI 19.49 kg/m2  SpO2 100%     Physical Exam   Constitutional: He is oriented to person, place, and time. He appears well-developed and well-nourished.   Cardiovascular: Normal rate.    Pulmonary/Chest: Effort normal.   Neurological: He is alert and oriented to person, place, and time.   Psychiatric: He has a normal mood and affect. Judgment normal.       Right Hand: Ruptured bullae and third digit near PIP joint. Well-healing abscess, no drainage . Erythema in the area of the ruptured bullae, but surrounding tissues are all normal. Full range of motion of digit. No tenderness to palpation.       Assessment/Plan:     1. Cellulitis of finger of right hand  Continue antibiotics as prescribed, day 8/10 Bactrim, day 4/10 Augmentin  Okay for full duty, keep wound covered at work  Follow-up one week        "

## 2017-08-15 NOTE — MR AVS SNAPSHOT
"        Hebert Sawyer   8/15/2017 8:20 AM   Occupational Medicine   MRN: [id number]    Department:  [facility]   Dept Phone:  [phone]    Description:  Male : 1995   Provider:  Jose Bain D.O.           Reason for Visit     Follow-Up WC DOI 2017 - R Finger - Better - ROOM 3      Allergies as of 8/15/2017     No Known Allergies      You were diagnosed with     Cellulitis of finger of right hand   [[id number]]         Vital Signs     Blood Pressure Pulse Temperature Respirations Height Weight    110/64 mmHg 67 36.9 °C (98.4 °F) 12 1.6 m (5' 2.99\") 49.896 kg (110 lb)    Body Mass Index Oxygen Saturation                19.49 kg/m2 100%          Basic Information     Date Of Birth Sex Race Ethnicity Preferred Language    1995 Male White Non- English      Your appointments     Aug 21, 2017  8:20 AM   Workers Compensation with Jose Bain D.O.   [address] [id number] [phone]              Health Maintenance        Date Due Completion Dates    IMM HEP B VACCINE (1 of 3 - Primary Series) 1995 ---    IMM HEP A VACCINE (1 of 2 - Standard Series) 10/2/1998 ---    IMM HPV VACCINE (1 of 3 - Male 3 Dose Series) 10/2/2008 ---    IMM VARICELLA (CHICKENPOX) VACCINE (1 of 2 - 2 Dose Adolescent Series) 10/2/2010 ---    IMM MENINGOCOCCAL VACCINE (MCV4) (1 of 1) 10/2/2013 ---    IMM DTaP/Tdap/Td Vaccine (1 - Tdap) 10/2/2016 ---    IMM INFLUENZA (1) 2017 ---            Current Immunizations     No immunizations on file.      Below and/or attached are the medications your provider expects you to take. Review all of your home medications and newly ordered medications with your provider and/or pharmacist. Follow medication instructions as directed by your provider and/or pharmacist. Please keep your medication list with you and share with your provider. Update the information when medications are discontinued, " doses are changed, or new medications (including over-the-counter products) are added; and carry medication information at all times in the event of emergency situations     Allergies:  No Known Allergies          Medications  Valid as of: August 15, 2017 -  8:39 AM    Generic Name Brand Name Tablet Size Instructions for use    Amoxicillin-Pot Clavulanate (Tab) AUGMENTIN 875-125 MG Take 1 Tab by mouth 2 times a day for 10 days.        .                 Medicines prescribed today were sent to:     [facility] [address] [place] [address] & [address] [address]    Phone: [phone] Fax: [phone]    Open 24 Hours?: No      Medication refill instructions:       If your prescription bottle indicates you have medication refills left, it is not necessary to call your provider’s office. Please contact your pharmacy and they will refill your medication.    If your prescription bottle indicates you do not have any refills left, you may request refills at any time through one of the following ways: The online CornerBlue system (except Urgent Care), by calling your provider’s office, or by asking your pharmacy to contact your provider’s office with a refill request. Medication refills are processed only during regular business hours and may not be available until the next business day. Your provider may request additional information or to have a follow-up visit with you prior to refilling your medication.   *Please Note: Medication refills are assigned a new Rx number when refilled electronically. Your pharmacy may indicate that no refills were authorized even though a new prescription for the same medication is available at the pharmacy. Please request the medicine by name with the pharmacy before contacting your provider for a refill.           CornerBlue Access Code: [email or web address]  Expires: 9/5/2017 11:35 AM    Your email address is not on file at [email or web address].  Email Addresses are required for you to sign up for [employer], please contact [phone] to verify your personal information and to provide your email address prior to attempting to register for BioMotiv.    Hebert Sawyer  [address] [place] [address]    Musik[employer]  A secure, online tool to manage your health information     [employer]’s BioMotiv® is a secure, online tool that connects you to your personalized health information from the privacy of your home -- day or night - making it very easy for you to manage your healthcare. Once the activation process is completed, you can even access your medical information using the BioMotiv hailey, which is available for free in the Apple Hailey store or Google Play store.     To learn more about BioMotiv, visit [email or web address]    There are two levels of access available (as shown below):   My Chart Features  [facility] Primary Care Doctor [facility]  Specialists [facility]  Urgent  Care Non-[facility] Primary Care Doctor   Email your healthcare team securely and privately 24/7 X X X    Manage appointments: schedule your next appointment; view details of past/upcoming appointments X      Request prescription refills. X      View recent personal medical records, including lab and immunizations X X X X   View health record, including health history, allergies, medications X X X X   Read reports about your outpatient visits, procedures, consult and ER notes X X X X   See your discharge summary, which is a recap of your hospital and/or ER visit that includes your diagnosis, lab results, and care plan X X  X     How to register for BioMotiv:  Once your e-mail address has been verified, follow the following steps to sign up for BioMotiv.     1. Go to  [email or web address]  2. Click on the Sign Up Now box, which takes you to the New Member Sign Up page. You will need to provide the following information:  a. Enter your BioMotiv Access Code exactly as it appears at the top  of this page. (You will not need to use this code after you’ve completed the sign-up process. If you do not sign up before the expiration date, you must request a new code.)   b. Enter your date of birth.   c. Enter your home email address.   d. Click Submit, and follow the next screen’s instructions.  3. Create a Jotky ID. This will be your Jotky login ID and cannot be changed, so think of one that is secure and easy to remember.  4. Create a Jotky password. You can change your password at any time.  5. Enter your Password Reset Question and Answer. This can be used at a later time if you forget your password.   6. Enter your e-mail address. This allows you to receive e-mail notifications when new information is available in Jotky.  7. Click Sign Up. You can now view your health information.    For assistance activating your Jotky account, call [phone]

## 2017-08-15 NOTE — Clinical Note
[address],   [address] [place] [address]  Phone: [phone] - Fax: [phone]        [facility] Progress Report and Disability Certification  Date of Service: 8/15/2017   No Show:  No  Date / Time of Next Visit: 8/21/2017 @ 8:20am   Claim Information   Patient Name: Hebert Sawyer  Claim Number:     Employer:   [employer] Date of Injury: 8/2/2017     Insurer / TPA: Dwayne Hudson  ID / SSN:     Occupation: [employer]  Diagnosis: The encounter diagnosis was Cellulitis of finger of right hand.    Medical Information   Related to Industrial Injury?   Comments:indeterminate    Subjective Complaints:  DOI 8/2/2017: Does not recall a specific injury but thinks that he may have had a small splinter in his right middle finger from a wooden mop. He developed a blister, which he popped and drained pus and blood. The finger became increasingly swollen and red and so presented to urgent care on 8/6/17. He was given Bactrim on 8/6. Then given IM Rocephin on 8/9 and started on Augmentin. Patient notes significant improvement in the swelling and pain at the finger. Denies any drainage. He states she's been taking antibiotic as prescribed.         Objective Findings: Right Hand: Ruptured bullae and third digit near PIP joint. Well-healing abscess, no drainage . Erythema in the area of the ruptured bullae, but surrounding tissues are all normal. Full range of motion of digit. No tenderness to palpation.   Pre-Existing Condition(s):     Assessment:   Condition Improved    Status: Additional Care Required  Permanent Disability:No    Plan:      Diagnostics:      Comments:  Continue antibiotics as prescribed, day 8/10 Bactrim, day 4/10 Augmentin  Okay for full duty, keep wound covered at work  Follow-up one week    Disability Information   Status: Released to Full Duty    From:  8/15/2017  Through: 8/21/2017 Restrictions are:     Physical  Restrictions   Sitting:    Standing:    Stooping:    Bending:      Squatting:    Walking:    Climbing:    Pushing:      Pulling:    Other:    Reaching Above Shoulder (L):   Reaching Above Shoulder (R):       Reaching Below Shoulder (L):    Reaching Below Shoulder (R):      Not to exceed Weight Limits   Carrying(hrs):   Weight Limit(lb):   Lifting(hrs):   Weight  Limit(lb):     Comments:      Repetitive Actions   Hands: i.e. Fine Manipulations from Grasping:     Feet: i.e. Operating Foot Controls:     Driving / Operate Machinery:     Physician Name: Jose Bain D.O. Physician Signature: JOSE Willoughby D.O. e-Signature: Dr. Monster Mckeon, Medical Director   Clinic Name / Location: [address],   [address] [place] [address] Clinic Phone Number: Dept: [phone]   Appointment Time: 8:20 Am Visit Start Time: 8:10 AM   Check-In Time:  8:00 Am Visit Discharge Time:  8:37am   Original-Treating Physician or Chiropractor    Page 2-Insurer/TPA    Page 3-Employer    Page 4-Employee

## 2017-08-21 ENCOUNTER — OCCUPATIONAL MEDICINE (OUTPATIENT)
Dept: OCCUPATIONAL MEDICINE | Facility: CLINIC | Age: 22
End: 2017-08-21
Payer: COMMERCIAL

## 2017-08-21 VITALS
TEMPERATURE: 98.1 F | RESPIRATION RATE: 14 BRPM | WEIGHT: 110 LBS | BODY MASS INDEX: 19.49 KG/M2 | OXYGEN SATURATION: 96 % | HEART RATE: 76 BPM | SYSTOLIC BLOOD PRESSURE: 120 MMHG | DIASTOLIC BLOOD PRESSURE: 64 MMHG | HEIGHT: 63 IN

## 2017-08-21 DIAGNOSIS — L03.011 CELLULITIS OF FINGER OF RIGHT HAND: ICD-10-CM

## 2017-08-21 PROCEDURE — 99212 OFFICE O/P EST SF 10 MIN: CPT | Performed by: PREVENTIVE MEDICINE

## 2017-08-21 ASSESSMENT — PAIN SCALES - GENERAL: PAINLEVEL: NO PAIN

## 2017-08-21 NOTE — PROGRESS NOTES
"Subjective:      Hebert Sawyer is a 21 y.o. male who presents with Follow-Up      DOI 8/2/2017: Does not recall a specific injury but thinks that he may have had a small splinter in his right middle finger from a wooden mop. He developed a blister, which he popped and drained pus and blood. The finger became increasingly swollen and red and so presented to urgent care on 8/6/17. He was given Bactrim on 8/6. Then given IM Rocephin on 8/9 and started on Augmentin.     HPI    ROS       Objective:     /64 mmHg  Pulse 76  Temp(Src) 36.7 °C (98.1 °F)  Resp 14  Ht 1.6 m (5' 2.99\")  Wt 49.896 kg (110 lb)  BMI 19.49 kg/m2  SpO2 96%     Physical Exam    Right Hand: Well-healed ruptured bullae. No abscess. Slight erythema palmar fourth digit, no warmth. Full range of motion of digit. No tenderness to palpation.       Assessment/Plan:     1. Cellulitis of finger of right hand  Finish remaining antibiotics. No special care required at this point  Full Duty  Release from care  Follow up as needed          "

## 2017-08-21 NOTE — Clinical Note
[address],   [address] [place] [address]  Phone: [phone] - Fax: [phone]        [facility] Progress Report and Disability Certification  Date of Service: 8/21/2017   No Show:  No  Date / Time of Next Visit:  Release from care   Claim Information   Patient Name: Hebert Sawyer  Claim Number:     Employer:   [employer]   Date of Injury: 8/2/2017     Insurer / TPA: Dwayne Hudson  ID / SSN:     Occupation: [employer]  Diagnosis: The encounter diagnosis was Cellulitis of finger of right hand.    Medical Information   Related to Industrial Injury?   Comments:indeterminate    Subjective Complaints:  DOI 8/2/2017: Does not recall a specific injury but thinks that he may have had a small splinter in his right middle finger from a wooden mop. He developed a blister, which he popped and drained pus and blood. The finger became increasingly swollen and red and so presented to urgent care on 8/6/17. He was given Bactrim on 8/6. Then given IM Rocephin on 8/9 and started on Augmentin.   Objective Findings: Right Hand: Well-healed ruptured bullae. No abscess. Slight erythema palmar fourth digit, no warmth. Full range of motion of digit. No tenderness to palpation.   Pre-Existing Condition(s):     Assessment:   Condition Improved    Status: Discharged /  MMI  Permanent Disability:No    Plan:      Diagnostics:      Comments:  Finish remaining antibiotics. No special care required at this point  Full Duty  Release from care  Follow up as needed    Disability Information   Status: Released to Full Duty    From:  8/21/2017  Through:   Restrictions are:     Physical Restrictions   Sitting:    Standing:    Stooping:    Bending:      Squatting:    Walking:    Climbing:    Pushing:      Pulling:    Other:    Reaching Above Shoulder (L):   Reaching Above Shoulder (R):       Reaching Below Shoulder (L):    Reaching Below Shoulder (R):      Not to  exceed Weight Limits   Carrying(hrs):   Weight Limit(lb):   Lifting(hrs):   Weight  Limit(lb):     Comments:      Repetitive Actions   Hands: i.e. Fine Manipulations from Grasping:     Feet: i.e. Operating Foot Controls:     Driving / Operate Machinery:     Physician Name: Jose Bain D.O. Physician Signature: [email or web address] BLAKELY D.O. e-Signature: Dr. Monster Mckeon, Medical Director   Clinic Name / Location: [address],   [address] [place] [address] Clinic Phone Number: Dept: [phone]   Appointment Time: 8:20 Am Visit Start Time: 8:13 AM   Check-In Time:  8:09 Am Visit Discharge Time: @8:26AM    Original-Treating Physician or Chiropractor    Page 2-Insurer/TPA    Page 3-Employer    Page 4-Employee

## 2017-08-21 NOTE — MR AVS SNAPSHOT
"        Hebert Sawyer   2017 8:20 AM   Occupational Medicine   MRN: [id number]    Department:  [facility]   Dept Phone:  [phone]    Description:  Male : 1995   Provider:  Jose Bain D.O.           Reason for Visit     Follow-Up WC DOI 2017 - Finger - Better - ROOM 2      Allergies as of 2017     No Known Allergies      You were diagnosed with     Cellulitis of finger of right hand   [432431]         Vital Signs     Blood Pressure Pulse Temperature Respirations Height Weight    120/64 mmHg 76 36.7 °C (98.1 °F) 14 1.6 m (5' 2.99\") 49.896 kg (110 lb)    Body Mass Index Oxygen Saturation                19.49 kg/m2 96%          Basic Information     Date Of Birth Sex Race Ethnicity Preferred Language    1995 Male White Non- English      Health Maintenance        Date Due Completion Dates    IMM HEP B VACCINE (1 of 3 - Primary Series) 1995 ---    IMM HEP A VACCINE (1 of 2 - Standard Series) 10/2/1998 ---    IMM HPV VACCINE (1 of 3 - Male 3 Dose Series) 10/2/2008 ---    IMM VARICELLA (CHICKENPOX) VACCINE (1 of 2 - 2 Dose Adolescent Series) 10/2/2010 ---    IMM MENINGOCOCCAL VACCINE (MCV4) (1 of 1) 10/2/2013 ---    IMM DTaP/Tdap/Td Vaccine (1 - Tdap) 10/2/2016 ---    IMM INFLUENZA (1) 2017 ---            Current Immunizations     No immunizations on file.      Below and/or attached are the medications your provider expects you to take. Review all of your home medications and newly ordered medications with your provider and/or pharmacist. Follow medication instructions as directed by your provider and/or pharmacist. Please keep your medication list with you and share with your provider. Update the information when medications are discontinued, doses are changed, or new medications (including over-the-counter products) are added; and carry medication information at all times in the event of emergency situations     Allergies:  No Known Allergies          "   Medications  Valid as of: August 21, 2017 - 11:06 AM    Generic Name Brand Name Tablet Size Instructions for use    Amoxicillin-Pot Clavulanate (Tab) AUGMENTIN 875-125 MG Take 1 Tab by mouth 2 times a day for 10 days.        .                 Medicines prescribed today were sent to:     [facility], [place] - [address]    [address] [place] [address]    Phone: [phone] Fax: [phone]    Open 24 Hours?: No      Medication refill instructions:       If your prescription bottle indicates you have medication refills left, it is not necessary to call your provider’s office. Please contact your pharmacy and they will refill your medication.    If your prescription bottle indicates you do not have any refills left, you may request refills at any time through one of the following ways: The online Moh[employer] system (except Urgent Care), by calling your provider’s office, or by asking your pharmacy to contact your provider’s office with a refill request. Medication refills are processed only during regular business hours and may not be available until the next business day. Your provider may request additional information or to have a follow-up visit with you prior to refilling your medication.   *Please Note: Medication refills are assigned a new Rx number when refilled electronically. Your pharmacy may indicate that no refills were authorized even though a new prescription for the same medication is available at the pharmacy. Please request the medicine by name with the pharmacy before contacting your provider for a refill.           Moh[employer] Access Code: [email or web address]  Expires: 9/5/2017 11:35 AM    Your email address is not on file at [facility].  Email Addresses are required for you to sign up for [employer], please contact [phone] to verify your personal information and to provide your email address prior to attempting to register for  [employer].    Hebert Tolbert Digna  [address] [place] [address]    MyChart  A secure, online tool to manage your health information     [employer]s [employer]® is a secure, online tool that connects you to your personalized health information from the privacy of your home -- day or night - making it very easy for you to manage your healthcare. Once the activation process is completed, you can even access your medical information using the [employer] hailey, which is available for free in the Apple Hailey store or Google Play store.     To learn more about [employer], visit [email or web address]    There are two levels of access available (as shown below):   My Chart Features  [facility] Primary Care Doctor [facility]  Specialists [facility]  Urgent  Care Non-[facility] Primary Care Doctor   Email your healthcare team securely and privately 24/7 X X X    Manage appointments: schedule your next appointment; view details of past/upcoming appointments X      Request prescription refills. X      View recent personal medical records, including lab and immunizations X X X X   View health record, including health history, allergies, medications X X X X   Read reports about your outpatient visits, procedures, consult and ER notes X X X X   See your discharge summary, which is a recap of your hospital and/or ER visit that includes your diagnosis, lab results, and care plan X X  X     How to register for [employer]:  Once your e-mail address has been verified, follow the following steps to sign up for [employer].     1. Go to  [email or web address]  2. Click on the Sign Up Now box, which takes you to the New Member Sign Up page. You will need to provide the following information:  a. Enter your [employer] Access Code exactly as it appears at the top of this page. (You will not need to use this code after you’ve completed the sign-up process. If you do not sign up before the expiration date, you must request a new code.)   b. Enter your date of birth.    c. Enter your home email address.   d. Click Submit, and follow the next screen’s instructions.  3. Create a [employer]t ID. This will be your [employer] login ID and cannot be changed, so think of one that is secure and easy to remember.  4. Create a [employer]t password. You can change your password at any time.  5. Enter your Password Reset Question and Answer. This can be used at a later time if you forget your password.   6. Enter your e-mail address. This allows you to receive e-mail notifications when new information is available in [employer].  7. Click Sign Up. You can now view your health information.    For assistance activating your [employer] account, call [phone]